# Patient Record
Sex: MALE | Race: WHITE
[De-identification: names, ages, dates, MRNs, and addresses within clinical notes are randomized per-mention and may not be internally consistent; named-entity substitution may affect disease eponyms.]

---

## 2018-01-18 ENCOUNTER — HOSPITAL ENCOUNTER (EMERGENCY)
Dept: HOSPITAL 72 - EMR | Age: 28
Discharge: HOME | End: 2018-01-18
Payer: MEDICAID

## 2018-01-18 VITALS — DIASTOLIC BLOOD PRESSURE: 78 MMHG | SYSTOLIC BLOOD PRESSURE: 125 MMHG

## 2018-01-18 VITALS — HEIGHT: 75 IN | WEIGHT: 190 LBS | BODY MASS INDEX: 23.62 KG/M2

## 2018-01-18 DIAGNOSIS — J11.1: Primary | ICD-10-CM

## 2018-01-18 PROCEDURE — 99283 EMERGENCY DEPT VISIT LOW MDM: CPT

## 2018-01-18 NOTE — EMERGENCY ROOM REPORT
History of Present Illness


General


Chief Complaint:  Flu Like Symptoms


Source:  Patient





Present Illness


HPI


Is a 27-year-old male with no past medical history.  He presents with chief 

complaint of coughing congestion and flulike illness.  Onset for last 2 to 3 

days.  He just came back from the [place] area.  Some of the friends are positive 

for influenza.  Coughing is nonproductive.  Subjective low-grade fever.  No 

nausea vomiting or diarrhea.  No chest pain.  Has body aches.


Allergies:  


Coded Allergies:  


     No Known Allergies (Unverified , 1/18/18)





Patient History


Past Medical History:  see triage record, old chart reviewed


Past Surgical History:  none


Pertinent Family History:  none


Social History:  Denies: smoking


Immunizations:  other


Reviewed Nursing Documentation:  PMH: Agreed, PSxH: Agreed





Nursing Documentation-PMH


Past Medical History:  No Stated History





Review of Systems


Constitutional:  Reports: fever, weakness


Eye:  Denies: eye pain, blurred vision


ENT:  Reports: nose congestion, throat pain


Respiratory:  Reports: cough


Cardiovascular:  Denies: chest pain, palpitations


Gastrointestinal:  Denies: abdominal pain, diarrhea, nausea, vomiting


Musculoskeletal:  Denies: back pain, joint pain


Skin:  Denies: rash


Neurological:  Denies: headache, numbness


Endocrine:  Denies: increased thirst, increased urine


Hematologic/Lymphatic:  Denies: easy bruising


All Other Systems:  negative except mentioned in HPI





Physical Exam





Vital Signs








  Date Time  Temp Pulse Resp B/P (MAP) Pulse Ox O2 Delivery O2 Flow Rate FiO2


 


1/18/18 01:25 97.5 56 16 124/79 99 Room Air  





vitals normal


Sp02 EP Interpretation:  reviewed, normal


General Appearance:  well appearing, no apparent distress, alert


Head:  normocephalic, atraumatic


Eyes:  bilateral eye PERRL, bilateral eye EOMI


ENT:  hearing grossly normal, normal pharynx


Neck:  full range of motion, supple, no meningismus


Respiratory:  chest non-tender, lungs clear, normal breath sounds


Cardiovascular #1:  regular rate, rhythm, no murmur


Gastrointestinal:  normal bowel sounds, non tender, no mass, no organomegaly, 

no bruit, non-distended


Musculoskeletal:  back normal, gait/station normal, normal range of motion


Psychiatric:  mood/affect normal


Skin:  warm/dry





Medical Decision Making


Diagnostic Impression:  


 Primary Impression:  


 Influenza-like symptoms


ER Course


Patient presents with a viral illness.  No evidence of sepsis, pneumonia, 

meningitis or other bacterial infection.  He is outside of the window for 

Tamiflu.  Symptom is very mild.  We'll discharge home with reassurance.





Last Vital Signs








  Date Time  Temp Pulse Resp B/P (MAP) Pulse Ox O2 Delivery O2 Flow Rate FiO2


 


1/18/18 01:34  56 16   Room Air  


 


1/18/18 01:25 97.5   124/79 99   








Status:  improved


Disposition:  HOME, SELF-CARE


Condition:  Stable


Scripts


Ibuprofen* (MOTRIN*) 600 Mg Tablet


600 MG ORAL THREE TIMES A DAY, #30 TAB 0 Refills


   Prov: MARTY MICHAEL M.D.         1/18/18


Referrals:  


NOT CHOSEN [facility]/MD,REFERRING (PCP)





Additional Instructions:  


Followup with your Dr. in 7 days.  Increase fluids.  Return if symptom worsen.











MARTY MICHAEL M.D. Jan 18, 2018 01:40